# Patient Record
Sex: FEMALE | Race: WHITE
[De-identification: names, ages, dates, MRNs, and addresses within clinical notes are randomized per-mention and may not be internally consistent; named-entity substitution may affect disease eponyms.]

---

## 2019-04-26 ENCOUNTER — HOSPITAL ENCOUNTER (EMERGENCY)
Dept: HOSPITAL 80 - FED | Age: 24
Discharge: HOME | End: 2019-04-26
Payer: COMMERCIAL

## 2019-04-26 VITALS — DIASTOLIC BLOOD PRESSURE: 70 MMHG | SYSTOLIC BLOOD PRESSURE: 125 MMHG
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
## 2019-04-26 DIAGNOSIS — S81.811A: Primary | ICD-10-CM

## 2019-04-26 PROCEDURE — 0HQKXZZ REPAIR RIGHT LOWER LEG SKIN, EXTERNAL APPROACH: ICD-10-PCS

## 2019-04-26 NOTE — EDPHY
H & P


Stated Complaint: R lower leg lac


Time Seen by Provider: 04/26/19 01:06


HPI/ROS: 





HPI


The patient presents with right shin laceration obtained just prior to arrival 

when she hit her leg into a table.  She denies any foreign body sensation.  She 

has been able to walk without difficulty.  She has no other complaints.





REVIEW OF SYSTEMS


10 systems were reviewed and negative with the exception of the elements 

mentioned in the history of present illness.





PMHx:  Healthy





Soc Hx:  College student





PHYSICAL


General Appearance: Alert, no distress


Respiratory:  Breathing comfortably


Neurological:  A&O, moves all extremities


Skin:  Warm and dry, no rashes


Musculoskeletal:  Right anterior lower leg with 1.5 cm gaping transverse 

laceration


Extremities:  symmetrical, full range of motion 


Psychiatric:  Patient is oriented X 3, there is no agitation 





Source: Patient


Exam Limitations: No limitations





- Personal History


LMP (Females 10-55): 8-14 Days Ago


Current Tetanus/Diphtheria Vaccine: Yes


Current Tetanus Diphtheria and Acellular Pertussis (TDAP): Yes





- Medical/Surgical History


Hx Asthma: No


Hx Chronic Respiratory Disease: No


Hx Diabetes: No


Hx Cardiac Disease: No


Hx Renal Disease: No


Hx Cirrhosis: No


Hx Alcoholism: No


Hx HIV/AIDS: No


Hx Splenectomy or Spleen Trauma: No


Other PMH: app7





- Social History


Smoking Status: Never smoked


Constitutional: 


 Initial Vital Signs











Temperature (C)  36.6 C   04/26/19 00:59


 


Heart Rate  93   04/26/19 00:59


 


Respiratory Rate  16   04/26/19 00:59


 


Blood Pressure  143/95 H  04/26/19 00:59


 


O2 Sat (%)  97   04/26/19 00:59








 











O2 Delivery Mode               Room Air














Allergies/Adverse Reactions: 


 





No Known Allergies Allergy (Unverified 04/26/19 00:58)


 








Home Medications: 














 Medication  Instructions  Recorded


 


NK [No Known Home Meds]  04/26/19














Medical Decision Making


Procedures: 





LACERATION REPAIR


Procedure:  Laceration repair.


Verbal consent was obtained from the patient.  The linear 1.5 cm laceration on 

the right anterior leg was anesthetized using lidocaine 1% with epinephrine 

total of 1 mL.  The wound was scrubbed, draped and explored to its base with a 

gloved finger. There were no deep structures involved.  No tendon injury was 

identified.  .  The wound was repaired with 4-0 Prolene, combination of simple 

interrupted and horizontal mattress sutures.  The wound repair was simple.  The 

procedure was performed by myself.





Differential Diagnosis: 





23-year-old female sustained right lower extremity laceration, hitting her leg 

on a table.  Neurovascularly intact.  No tendon injuries visualized.  

Laceration repaired in the emergency department.  Patient discharged to home.





Departure





- Departure


Disposition: Home, Routine, Self-Care


Clinical Impression: 


Laceration of right lower leg


Qualifiers:


 Encounter type: initial encounter Qualified Code(s): S81.811A - Laceration 

without foreign body, right lower leg, initial encounter





Condition: Good


Instructions:  Care For Your Stitches (ED)


Additional Instructions: 


Your stitches should be removed in 7 days.  You can come to the emergency 

department for this or go to [facility].


Referrals: 


[facility] [Clinic] - As per Instructions